# Patient Record
Sex: FEMALE | Race: WHITE
[De-identification: names, ages, dates, MRNs, and addresses within clinical notes are randomized per-mention and may not be internally consistent; named-entity substitution may affect disease eponyms.]

---

## 2022-07-27 ENCOUNTER — HOSPITAL ENCOUNTER (EMERGENCY)
Dept: HOSPITAL 75 - ER | Age: 32
Discharge: HOME | End: 2022-07-27
Payer: MEDICAID

## 2022-07-27 VITALS — DIASTOLIC BLOOD PRESSURE: 90 MMHG | SYSTOLIC BLOOD PRESSURE: 131 MMHG

## 2022-07-27 DIAGNOSIS — M43.6: Primary | ICD-10-CM

## 2022-07-27 DIAGNOSIS — M25.511: ICD-10-CM

## 2022-07-27 DIAGNOSIS — Z28.310: ICD-10-CM

## 2022-07-27 DIAGNOSIS — F17.200: ICD-10-CM

## 2022-07-27 PROCEDURE — 99284 EMERGENCY DEPT VISIT MOD MDM: CPT

## 2022-07-27 NOTE — ED UPPER EXTREMITY
General


Chief Complaint:  Upper Extremity


Stated Complaint:  RT ARM PAIN


Nursing Triage Note:  


pt reports around 0000 having pain in her neck and right shoulder/arm. reports 


since that time pain has become worse. pt has right arm over her head and 


reports that if she lowers it the pain in her right shoulder is extreme. denies 


known injury.


Source:  patient


Exam Limitations:  no limitations





History of Present Illness


Date Seen by Provider:  Jul 27, 2022


Time Seen by Provider:  03:28


Initial Comments


Patient to the ER by private conveyance with chief complaint that last night 

before she went to bed she was having some twinges of spasm and pain in her 

right neck trapezius and shoulder.  It woke her up about 3 hours later with 

spasming tightening pain and she has to hold her right arm over her shoulder.  

She is right-handed.  She has no history of trauma, falls, surgeries or imaging 

of her neck or back.  No loss of control of bowel or bladder, saddle anesthesia 

numbness or tingling in her lower extremities.  She has a little bit of tingling

along the back of her right arm.  No other significant medical history.  Couple 

days ago she did have about a days worth of malaise, body aches and felt like shabana bhatti had some nausea like she had a 1 day bug but it went away before 48 hours.  

She applied multiple hot compresses without benefit.





Allergies and Home Medications


Allergies


Coded Allergies:  


     No Known Drug Allergies (Unverified , 7/27/22)





Patient Home Medication List


Home Medication List Reviewed:  Yes





Review of Systems


Constitutional:  No chills, No diaphoresis


EENTM:  No ear discharge, No ear pain


Respiratory:  No cough, No short of breath


Cardiovascular:  No edema, No palpitations


Gastrointestinal:  No abdominal pain, No nausea


Genitourinary:  No discharge, No dysuria


Musculoskeletal:  No back pain, No joint pain





All Other Systems Reviewed


Negative Unless Noted:  Yes





Past Medical-Social-Family Hx


Patient Social History


Tobacco Use?:  Yes


Smoking Status:  Current Everyday Smoker


Substance use?:  Yes


Substance type:  Marijuana


Alcohol Use?:  No


Pt feels they are or have been:  No





Immunizations Up To Date


Influenza Vaccine Up-to-Date:  No; Not Current





Physical Exam


Vital Signs





Vital Signs - First Documented








 7/27/22





 03:20


 


Temp 36.5


 


Pulse 90


 


Resp 18


 


B/P (MAP) 131/90 (104)


 


Pulse Ox 97





Capillary Refill :


Height, Weight, BMI


Height: '"


Weight: lbs. oz. kg;  BMI


Method:


General Appearance:  WD/WN, no apparent distress


HEENT:  PERRL/EOMI, pharynx normal


Neck:  supple, other (Right trapezius and spasm with tenderness.  Head held 

towards the right with right arm over her head.  Shoulder flexed towards her ne

ck on the right side.)


Cardiovascular:  normal peripheral pulses, regular rate, rhythm


Respiratory:  lungs clear, normal breath sounds, no respiratory distress, no 

accessory muscle use


Gastrointestinal:  normal bowel sounds, non tender


Neurologic/Psychiatric:  alert, normal mood/affect, oriented x 3


Skin:  normal color, warm/dry





Progress/Results/Core Measures


Results/Orders


Vital Signs/I&O











 7/27/22





 03:20


 


Temp 36.5


 


Pulse 90


 


Resp 18


 


B/P (MAP) 131/90 (104)


 


Pulse Ox 97














Blood Pressure Mean:                    104











Progress


Progress Note :  


   Time:  03:49


Progress Note


Toradol, Norflex and some steroids.  Viral torticollis.





Departure


Impression





   Primary Impression:  


   Torticollis, acquired


Disposition:  01 HOME, SELF-CARE


Condition:  Stable





Departure-Patient Inst.


Decision time for Depature:  03:50


Patient Instructions:  Torticollis (DC)





Add. Discharge Instructions:  


Apply warm moist heat as necessary for pain.  You may also use topical creams 

such as icy hot or Biofreeze.


Massages may be helpful.


Cyclobenzaprine 1/2 to 1 tablet every 8 hours as needed for muscle spasms.


Ibuprofen 800 mg every 8 hours needed for pain.


Tylenol 1000 mg every 8 hours needed for pain.


Prednisone 2 tablets daily for 4 more days.


Symptoms should resolve in about a week.


If they persist beyond that then follow-up with your primary care doctor for 

reevaluation and management.





All discharge instructions reviewed with patient and/or family. Voiced 

understanding.


Scripts


Cyclobenzaprine HCl (Cyclobenzaprine HCl) 10 Mg Tablet


5-10 MG PO Q8H PRN for SPASMS, #15 TAB 0 Refills


   Prov: REGINA CARRANZA         7/27/22 


Prednisone (Prednisone) 20 Mg Tab


40 MG PO DAILY for 4 Days, #8 TAB 0 Refills


   Prov: REGINA CARRANZA         7/27/22











REGINA CARRANZA                 Jul 27, 2022 03:47